# Patient Record
Sex: MALE | Race: AMERICAN INDIAN OR ALASKA NATIVE
[De-identification: names, ages, dates, MRNs, and addresses within clinical notes are randomized per-mention and may not be internally consistent; named-entity substitution may affect disease eponyms.]

---

## 2017-11-02 ENCOUNTER — HOSPITAL ENCOUNTER (EMERGENCY)
Dept: HOSPITAL 31 - C.ER | Age: 27
Discharge: HOME | End: 2017-11-02
Payer: SELF-PAY

## 2017-11-02 VITALS
HEART RATE: 73 BPM | RESPIRATION RATE: 16 BRPM | OXYGEN SATURATION: 97 % | DIASTOLIC BLOOD PRESSURE: 73 MMHG | TEMPERATURE: 98 F | SYSTOLIC BLOOD PRESSURE: 129 MMHG

## 2017-11-02 DIAGNOSIS — R07.89: Primary | ICD-10-CM

## 2017-11-02 NOTE — C.PDOC
History Of Present Illness


27 year old male presents to ED with complaints of positionally and digitally 

reproducible parasternal chest pain for the past 3 weeks. Notes that he is a 

, and occasionally lifts heavy objects. Pain is non-radiating. Denies 

shortness of breath, palpitations, or fever. No other complaints. 


 


Time Seen by Provider: 17 13:13


Chief Complaint (Nursing): Chest Pain


History Per: Patient


History/Exam Limitations: no limitations


Onset/Duration Of Symptoms: Days (3 weeks)


Current Symptoms Are (Timing): Still Present


Quality: "Pain"


Associated Symptoms: denies: Nausea, Dyspnea, Diaphoresis, Syncope


Modifying Factors: None


Exacerbating Factors: Movement


Alleviating Factors: None


Recent travel outside of the [place]: No


Additional History Per: Patient





Past Medical History


Reviewed: Historical Data, Nursing Documentation, Vital Signs


Vital Signs: 


 Last Vital Signs











Temp  98.0 F   17 13:07


 


Pulse  73   17 13:07


 


Resp  16   17 13:07


 


BP  129/73   17 13:07


 


Pulse Ox  97   17 13:36











Family History: States: Unknown Family Hx





- Social History


Hx Alcohol Use: No


Hx Substance Use: No





- Immunization History


Hx Tetanus Toxoid Vaccination: Yes


Hx Influenza Vaccination: No


Hx Pneumococcal Vaccination: No





Review Of Systems


Except As Marked, All Systems Reviewed And Found Negative.


Constitutional: Negative for: Fever, Chills


Cardiovascular: Positive for: Chest Pain.  Negative for: Palpitations, Edema, 

Light Headedness


Respiratory: Negative for: Cough, Shortness of Breath


Gastrointestinal: Negative for: Nausea, Vomiting


Neurological: Negative for: Headache, Dizziness





Physical Exam





- Physical Exam


Appears: Non-toxic, No Acute Distress


Skin: Normal Color, Warm, Dry, No Rash


Head: Atraumatic, Normacephalic


Eye(s): bilateral: Normal Inspection


Oral Mucosa: Moist


Neck: Supple


Chest: Symmetrical, No Deformity, Tenderness (positionally and digitally 

reproducible parasternal tenderness, no substernal tenderness), No Ecchymosis


Cardiovascular: Rhythm Regular, No Murmur


Respiratory: Normal Breath Sounds, No Rales, No Rhonchi


Gastrointestinal/Abdominal: Soft, No Tenderness


Extremity: Normal ROM, No Pedal Edema


Neurological/Psych: Oriented x3, Normal Speech, Normal Cognition





ED Course And Treatment


ECG: Interpreted By Me


ECG Rhythm: Sinus Rhythm


ECG Interpretation: Normal


Rate From EC


O2 Sat by Pulse Oximetry: 97


Pulse Ox Interpretation: Normal





Medical Decision Making


Medical Decision Making: 





positionally and digitally reproducable b/l parasternal chest discomfort


lifts heavy luggage as a 


not changed with sitting/laying to consider pericarditis.





Disposition


Doctor Will See Patient In The: Office


Counseled Patient/Family Regarding: Studies Performed, Diagnosis





- Disposition


Referrals: 


[facility] [Outside]


[facility] [Outside]


Disposition: HOME/ ROUTINE


Disposition Time: 13:35


Condition: GOOD


Additional Instructions: 


motrin 400-600 mg every 6 hours as needed for chest discomfort


ice packs to chest 1/2 hour per hour, nothing hot, no hot showers.





no heavy lifting for 1 week.


Instructions:  Costochondritis (ED)


Forms:  [facility] (English)





- Clinical Impression


Clinical Impression: 


 Chest discomfort








- Scribe Statement


The provider has reviewed the documentation as recorded by the Scribe


Fab Manzano





All medical record entries made by the Scribe were at my direction and 

personally dictated by me. I have reviewed the chart and agree that the record 

accurately reflects my personal performance of the history, physical exam, 

medical decision making, and the department course for this patient. I have 

also personally directed, reviewed, and agree with the discharge instructions 

and disposition.

## 2017-11-03 NOTE — CARD
--------------- APPROVED REPORT --------------





EKG Measurement

Heart [id number]

NM [id number]

[id number]

[id number]

[id number]



<Conclusion>

Normal sinus rhythm

Normal ECG

## 2018-08-07 ENCOUNTER — HOSPITAL ENCOUNTER (EMERGENCY)
Dept: HOSPITAL 31 - C.ER | Age: 28
Discharge: HOME | End: 2018-08-07
Payer: COMMERCIAL

## 2018-08-07 VITALS
HEART RATE: 62 BPM | DIASTOLIC BLOOD PRESSURE: 79 MMHG | OXYGEN SATURATION: 98 % | TEMPERATURE: 98.3 F | SYSTOLIC BLOOD PRESSURE: 125 MMHG | RESPIRATION RATE: 18 BRPM

## 2018-08-07 VITALS — BODY MASS INDEX: 24.7 KG/M2

## 2018-08-07 DIAGNOSIS — N34.2: ICD-10-CM

## 2018-08-07 DIAGNOSIS — L01.00: Primary | ICD-10-CM

## 2018-08-07 LAB
BILIRUB UR-MCNC: NEGATIVE MG/DL
GLUCOSE UR STRIP-MCNC: NORMAL MG/DL
LEUKOCYTE ESTERASE UR-ACNC: (no result) LEU/UL
PH UR STRIP: 6 [PH] (ref 5–8)
PROT UR STRIP-MCNC: NEGATIVE MG/DL
RBC # UR STRIP: NEGATIVE /UL
SP GR UR STRIP: 1.03 (ref 1–1.03)
UROBILINOGEN UR-MCNC: 2 MG/DL (ref 0.2–1)

## 2018-08-07 PROCEDURE — 87491 CHLMYD TRACH DNA AMP PROBE: CPT

## 2018-08-07 PROCEDURE — 99283 EMERGENCY DEPT VISIT LOW MDM: CPT

## 2018-08-07 PROCEDURE — 81001 URINALYSIS AUTO W/SCOPE: CPT

## 2018-08-07 PROCEDURE — 96372 THER/PROPH/DIAG INJ SC/IM: CPT

## 2018-08-07 PROCEDURE — 87591 N.GONORRHOEAE DNA AMP PROB: CPT

## 2018-08-07 NOTE — C.PDOC
History Of Present Illness


28 year old male presents to the ED for evaluation of swelling to his lower lip 

which began three days ago. Patient notes crusty yellow discharge from the 

area. Patient also reports 4-day history of penile discharge and admits to 

having unprotected sexual intercourse 3 weeks ago. Patient notes he was in 

[place] during this past week, from 8/1 to 8/6. He denies fever, chills. 


Time Seen by Provider: 08/07/18 15:21


Chief Complaint (Nursing): Abnormal Skin Integrity


History Per: Patient


History/Exam Limitations: no limitations


Onset/Duration Of Symptoms: Days


Current Symptoms Are (Timing): Still Present


Additional History Per: Patient





Past Medical History


Reviewed: Historical Data, Nursing Documentation, Vital Signs


Vital Signs: 


 Last Vital Signs











Temp  98.3 F   08/07/18 14:55


 


Pulse  62   08/07/18 14:55


 


Resp  18   08/07/18 14:55


 


BP  125/79   08/07/18 14:55


 


Pulse Ox  98   08/07/18 17:45














- Medical History


PMH: No Chronic Diseases


Surgical History: No Surg Hx


Family History: States: Unknown Family Hx





- Social History


Hx Alcohol Use: Yes


Hx Substance Use: No





- Immunization History


Hx Tetanus Toxoid Vaccination: Yes


Hx Influenza Vaccination: No


Hx Pneumococcal Vaccination: No





Review Of Systems


Constitutional: Negative for: Fever, Chills


ENT: Positive for: Other (lip swelling with discharge )


Genitourinary: Positive for: Penile Discharge





Physical Exam





- Physical Exam


Appears: Non-toxic, No Acute Distress


Skin: Normal Color, Warm, Dry


Head: Atraumatic, Normacephalic


Eye(s): bilateral: Normal Inspection


Oral Mucosa: Moist


Lips: Swelling (lower lip), Lesions (honey-colored crusted lesions on inside of 

lower lip )


Throat: Normal, No Erythema, No Exudate


Neck: Supple


Lymphatic: No Adenopathy


Chest: Symmetrical, No Deformity, No Tenderness


Cardiovascular: Rhythm Regular, No Murmur


Respiratory: Normal Breath Sounds, No Rales, No Rhonchi, No Wheezing


Gastrointestinal/Abdominal: Soft, No Tenderness, No Guarding, No Rebound


Back: CVA Tenderness (mild, left-sided )


Extremity: Normal ROM, Capillary Refill (less than 2 seconds )


Neurological/Psych: Oriented x3, Normal Speech, Normal Cognition





ED Course And Treatment


O2 Sat by Pulse Oximetry: 98 (on RA)


Pulse Ox Interpretation: Normal





Medical Decision Making


Medical Decision Making: 





Impression: 28 year old male with lip swelling, penile discharge 


Plan: 


* GC/Chlamydia 


* urinalysis 


* Rocephin IM 


* Zithromax PO 


* reassess and disposition 





Progress: 


GC/Chlamydia and urinalysis ordered and reviewed. 


Rocephin IM and Zithromax PO given. 








1735 pt given meds for sti in ed, will d/c with bactroban, f/u med clinic.  





Disposition


Counseled Patient/Family Regarding: Studies Performed, Diagnosis, Need For 

Followup, Rx Given





- Disposition


Referrals: 


[facility] [Outside]


Disposition: HOME/ ROUTINE


Disposition Time: 17:36


Condition: GOOD


Additional Instructions: 


Please drink increase fluids- your urine is concentrated. Tylenol or MOtrin for 

body aches.  Apply Mupirocin ointment (Bactroban) to lip area 2 times a day.  

Cold compresses to lip to decrease swelling. Follow up in medical clinic in a 

few days. Recommend protected sex at al times. 


Prescriptions: 


Mupirocin 2% Cream [Bactroban Cream] 1 applic EXT BID #1 tube


Instructions:  Impetigo (DC), Urethritis (DC)


Forms:  [facility] (English), General Discharge Instructions





- Clinical Impression


Clinical Impression: 


 Impetigo, Urethritis, nonspecific








- PA / NP / Resident Statement


MD/DO has reviewed & agrees with the documentation as recorded.





- Scribe Statement


The provider has reviewed the documentation as recorded by the Scribe (Yenny Manzano)








All medical record entries made by the Scribe were at my direction and 

personally dictated by me. I have reviewed the chart and agree that the record 

accurately reflects my personal performance of the history, physical exam, 

medical decision making, and the department course for this patient. I have 

also personally directed, reviewed, and agree with the discharge instructions 

and disposition.